# Patient Record
Sex: MALE
[De-identification: names, ages, dates, MRNs, and addresses within clinical notes are randomized per-mention and may not be internally consistent; named-entity substitution may affect disease eponyms.]

---

## 2021-12-25 ENCOUNTER — HOSPITAL ENCOUNTER (EMERGENCY)
Dept: HOSPITAL 5 - ED | Age: 26
Discharge: HOME | End: 2021-12-25
Payer: SELF-PAY

## 2021-12-25 VITALS — DIASTOLIC BLOOD PRESSURE: 88 MMHG | SYSTOLIC BLOOD PRESSURE: 124 MMHG
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
## 2021-12-25 DIAGNOSIS — M25.572: Primary | ICD-10-CM
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
## 2021-12-25 PROCEDURE — 99282 EMERGENCY DEPT VISIT SF MDM: CPT
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
